# Patient Record
Sex: MALE | Race: WHITE | NOT HISPANIC OR LATINO | Employment: FULL TIME | URBAN - METROPOLITAN AREA
[De-identification: names, ages, dates, MRNs, and addresses within clinical notes are randomized per-mention and may not be internally consistent; named-entity substitution may affect disease eponyms.]

---

## 2022-03-09 ENCOUNTER — OFFICE VISIT (OUTPATIENT)
Dept: UROLOGY | Facility: CLINIC | Age: 62
End: 2022-03-09

## 2022-03-09 VITALS — HEIGHT: 74 IN | BODY MASS INDEX: 29 KG/M2 | WEIGHT: 226 LBS | RESPIRATION RATE: 18 BRPM | HEART RATE: 105 BPM

## 2022-03-09 DIAGNOSIS — R33.9 URINARY RETENTION: ICD-10-CM

## 2022-03-09 DIAGNOSIS — R31.29 MICROHEMATURIA: Primary | ICD-10-CM

## 2022-03-09 LAB
BILIRUB BLD-MCNC: NEGATIVE MG/DL
CLARITY, POC: CLEAR
COLOR UR: ABNORMAL
EXPIRATION DATE: ABNORMAL
GLUCOSE UR STRIP-MCNC: NEGATIVE MG/DL
KETONES UR QL: NEGATIVE
LEUKOCYTE EST, POC: ABNORMAL
Lab: ABNORMAL
NITRITE UR-MCNC: NEGATIVE MG/ML
PH UR: 5.5 [PH] (ref 5–8)
PROT UR STRIP-MCNC: NEGATIVE MG/DL
RBC # UR STRIP: ABNORMAL /UL
SP GR UR: 1.01 (ref 1–1.03)
SPECIMEN VOL SMN: 208 ML
UROBILINOGEN UR QL: NORMAL

## 2022-03-09 PROCEDURE — 87086 URINE CULTURE/COLONY COUNT: CPT | Performed by: NURSE PRACTITIONER

## 2022-03-09 PROCEDURE — 99203 OFFICE O/P NEW LOW 30 MIN: CPT | Performed by: NURSE PRACTITIONER

## 2022-03-09 PROCEDURE — 51798 US URINE CAPACITY MEASURE: CPT | Performed by: NURSE PRACTITIONER

## 2022-03-09 PROCEDURE — 81003 URINALYSIS AUTO W/O SCOPE: CPT | Performed by: NURSE PRACTITIONER

## 2022-03-09 RX ORDER — SULFAMETHOXAZOLE AND TRIMETHOPRIM 800; 160 MG/1; MG/1
TABLET ORAL
COMMUNITY
Start: 2022-02-16 | End: 2022-03-21

## 2022-03-09 RX ORDER — BENAZEPRIL HYDROCHLORIDE 20 MG/1
TABLET ORAL
COMMUNITY
Start: 2022-01-26

## 2022-03-09 RX ORDER — CIPROFLOXACIN 500 MG/1
500 TABLET, FILM COATED ORAL EVERY 12 HOURS
COMMUNITY
Start: 2022-02-22 | End: 2022-03-21

## 2022-03-09 RX ORDER — GLIMEPIRIDE 4 MG/1
TABLET ORAL
COMMUNITY
Start: 2022-01-26

## 2022-03-09 RX ORDER — SIMVASTATIN 20 MG
TABLET ORAL
COMMUNITY
Start: 2022-01-26

## 2022-03-09 RX ORDER — TAMSULOSIN HYDROCHLORIDE 0.4 MG/1
1 CAPSULE ORAL DAILY
Qty: 90 CAPSULE | Refills: 0 | Status: SHIPPED | OUTPATIENT
Start: 2022-03-09 | End: 2022-06-07

## 2022-03-09 RX ORDER — ALLOPURINOL 100 MG/1
TABLET ORAL
COMMUNITY
Start: 2022-01-26

## 2022-03-09 RX ORDER — FUROSEMIDE 20 MG/1
TABLET ORAL
COMMUNITY
Start: 2022-01-26

## 2022-03-10 LAB — BACTERIA SPEC AEROBE CULT: NO GROWTH

## 2022-03-11 ENCOUNTER — HOSPITAL ENCOUNTER (OUTPATIENT)
Dept: CT IMAGING | Facility: HOSPITAL | Age: 62
Discharge: HOME | End: 2022-03-11
Admitting: NURSE PRACTITIONER

## 2022-03-11 DIAGNOSIS — R31.29 MICROHEMATURIA: ICD-10-CM

## 2022-03-11 LAB
CREAT BLDA-MCNC: 1.7 MG/DL
EGFRCR SERPLBLD CKD-EPI 2021: 45.3 ML/MIN/1.73

## 2022-03-11 PROCEDURE — 74178 CT ABD&PLV WO CNTR FLWD CNTR: CPT

## 2022-03-11 PROCEDURE — 82565 ASSAY OF CREATININE: CPT

## 2022-03-11 PROCEDURE — 0 IOPAMIDOL PER 1 ML: Performed by: NURSE PRACTITIONER

## 2022-03-11 RX ADMIN — IOPAMIDOL 100 ML: 755 INJECTION, SOLUTION INTRAVENOUS at 10:26

## 2022-03-18 PROBLEM — R31.0 GROSS HEMATURIA: Status: ACTIVE | Noted: 2022-03-18

## 2022-03-18 PROBLEM — R35.0 BENIGN PROSTATIC HYPERPLASIA WITH URINARY FREQUENCY: Status: ACTIVE | Noted: 2022-03-18

## 2022-03-18 PROBLEM — N40.1 BENIGN PROSTATIC HYPERPLASIA WITH URINARY FREQUENCY: Status: ACTIVE | Noted: 2022-03-18

## 2022-03-21 ENCOUNTER — PREP FOR SURGERY (OUTPATIENT)
Dept: OTHER | Facility: HOSPITAL | Age: 62
End: 2022-03-21

## 2022-03-21 ENCOUNTER — OFFICE VISIT (OUTPATIENT)
Dept: UROLOGY | Facility: CLINIC | Age: 62
End: 2022-03-21

## 2022-03-21 VITALS — WEIGHT: 226.4 LBS | HEIGHT: 74 IN | BODY MASS INDEX: 29.05 KG/M2

## 2022-03-21 DIAGNOSIS — R31.0 GROSS HEMATURIA: Primary | ICD-10-CM

## 2022-03-21 DIAGNOSIS — N20.0 NEPHROLITHIASIS: Primary | ICD-10-CM

## 2022-03-21 DIAGNOSIS — R35.0 BENIGN PROSTATIC HYPERPLASIA WITH URINARY FREQUENCY: ICD-10-CM

## 2022-03-21 DIAGNOSIS — N40.1 BENIGN PROSTATIC HYPERPLASIA WITH URINARY FREQUENCY: ICD-10-CM

## 2022-03-21 LAB
BILIRUB BLD-MCNC: NEGATIVE MG/DL
CLARITY, POC: ABNORMAL
COLOR UR: YELLOW
EXPIRATION DATE: 223
GLUCOSE UR STRIP-MCNC: NEGATIVE MG/DL
KETONES UR QL: NEGATIVE
LEUKOCYTE EST, POC: ABNORMAL
Lab: ABNORMAL
NITRITE UR-MCNC: NEGATIVE MG/ML
PH UR: 6 [PH] (ref 5–8)
PROT UR STRIP-MCNC: ABNORMAL MG/DL
RBC # UR STRIP: ABNORMAL /UL
SP GR UR: 1.02 (ref 1–1.03)
SPECIMEN VOL 24H UR: 30 L
UROBILINOGEN UR QL: NORMAL

## 2022-03-21 PROCEDURE — 87086 URINE CULTURE/COLONY COUNT: CPT | Performed by: UROLOGY

## 2022-03-21 PROCEDURE — 51798 US URINE CAPACITY MEASURE: CPT | Performed by: UROLOGY

## 2022-03-21 PROCEDURE — 81003 URINALYSIS AUTO W/O SCOPE: CPT | Performed by: UROLOGY

## 2022-03-21 PROCEDURE — 99214 OFFICE O/P EST MOD 30 MIN: CPT | Performed by: UROLOGY

## 2022-03-21 RX ORDER — SODIUM CHLORIDE 0.9 % (FLUSH) 0.9 %
10 SYRINGE (ML) INJECTION AS NEEDED
Status: CANCELLED | OUTPATIENT
Start: 2022-03-21

## 2022-03-21 RX ORDER — SODIUM CHLORIDE 9 MG/ML
100 INJECTION, SOLUTION INTRAVENOUS CONTINUOUS
Status: CANCELLED | OUTPATIENT
Start: 2022-03-21

## 2022-03-21 RX ORDER — SODIUM CHLORIDE 0.9 % (FLUSH) 0.9 %
3 SYRINGE (ML) INJECTION EVERY 12 HOURS SCHEDULED
Status: CANCELLED | OUTPATIENT
Start: 2022-03-21

## 2022-03-21 RX ORDER — LEVOFLOXACIN 5 MG/ML
500 INJECTION, SOLUTION INTRAVENOUS ONCE
Status: CANCELLED | OUTPATIENT
Start: 2022-03-21 | End: 2022-03-21

## 2022-03-22 LAB — BACTERIA SPEC AEROBE CULT: NO GROWTH

## 2022-03-23 ENCOUNTER — TELEPHONE (OUTPATIENT)
Dept: UROLOGY | Facility: CLINIC | Age: 62
End: 2022-03-23

## 2022-05-19 ENCOUNTER — LAB (OUTPATIENT)
Dept: LAB | Facility: HOSPITAL | Age: 62
End: 2022-05-19

## 2022-05-19 ENCOUNTER — TELEPHONE (OUTPATIENT)
Dept: UROLOGY | Facility: CLINIC | Age: 62
End: 2022-05-19

## 2022-05-19 DIAGNOSIS — R30.0 DYSURIA: Primary | ICD-10-CM

## 2022-05-19 DIAGNOSIS — R30.0 DYSURIA: ICD-10-CM

## 2022-05-19 PROCEDURE — 87086 URINE CULTURE/COLONY COUNT: CPT

## 2022-05-20 LAB — BACTERIA SPEC AEROBE CULT: NO GROWTH

## 2022-05-27 ENCOUNTER — OFFICE VISIT (OUTPATIENT)
Dept: UROLOGY | Facility: CLINIC | Age: 62
End: 2022-05-27

## 2022-05-27 VITALS — RESPIRATION RATE: 16 BRPM | BODY MASS INDEX: 28.36 KG/M2 | WEIGHT: 221 LBS | HEIGHT: 74 IN

## 2022-05-27 DIAGNOSIS — N35.811 OTHER STRICTURE OF URETHRAL MEATUS IN MALE: ICD-10-CM

## 2022-05-27 DIAGNOSIS — R30.0 DYSURIA: Primary | ICD-10-CM

## 2022-05-27 DIAGNOSIS — N34.2 URETHRITIS: ICD-10-CM

## 2022-05-27 LAB
BILIRUB BLD-MCNC: NEGATIVE MG/DL
CLARITY, POC: CLEAR
COLOR UR: YELLOW
EXPIRATION DATE: ABNORMAL
GLUCOSE UR STRIP-MCNC: NEGATIVE MG/DL
KETONES UR QL: NEGATIVE
LEUKOCYTE EST, POC: ABNORMAL
Lab: ABNORMAL
NITRITE UR-MCNC: NEGATIVE MG/ML
PH UR: 6 [PH] (ref 5–8)
PROT UR STRIP-MCNC: ABNORMAL MG/DL
RBC # UR STRIP: ABNORMAL /UL
SP GR UR: 1.01 (ref 1–1.03)
UROBILINOGEN UR QL: NORMAL

## 2022-05-27 PROCEDURE — 87086 URINE CULTURE/COLONY COUNT: CPT | Performed by: NURSE PRACTITIONER

## 2022-05-27 PROCEDURE — 87077 CULTURE AEROBIC IDENTIFY: CPT | Performed by: NURSE PRACTITIONER

## 2022-05-27 PROCEDURE — 99213 OFFICE O/P EST LOW 20 MIN: CPT | Performed by: NURSE PRACTITIONER

## 2022-05-27 PROCEDURE — 81003 URINALYSIS AUTO W/O SCOPE: CPT | Performed by: NURSE PRACTITIONER

## 2022-05-27 RX ORDER — DOXYCYCLINE HYCLATE 100 MG/1
100 CAPSULE ORAL 2 TIMES DAILY
Qty: 56 CAPSULE | Refills: 0 | Status: SHIPPED | OUTPATIENT
Start: 2022-05-27 | End: 2022-06-24

## 2022-05-29 LAB — BACTERIA SPEC AEROBE CULT: ABNORMAL

## 2022-06-29 ENCOUNTER — TELEPHONE (OUTPATIENT)
Dept: UROLOGY | Facility: CLINIC | Age: 62
End: 2022-06-29

## 2022-06-30 ENCOUNTER — DOCUMENTATION (OUTPATIENT)
Dept: UROLOGY | Facility: CLINIC | Age: 62
End: 2022-06-30

## 2022-06-30 DIAGNOSIS — R30.0 DYSURIA: Primary | ICD-10-CM

## 2022-06-30 RX ORDER — TAMSULOSIN HYDROCHLORIDE 0.4 MG/1
1 CAPSULE ORAL DAILY
Qty: 30 CAPSULE | Refills: 11 | Status: SHIPPED | OUTPATIENT
Start: 2022-06-30 | End: 2023-06-30

## 2022-07-12 ENCOUNTER — TELEPHONE (OUTPATIENT)
Dept: SURGERY | Facility: CLINIC | Age: 62
End: 2022-07-12

## 2024-07-23 ENCOUNTER — OFFICE VISIT (OUTPATIENT)
Dept: ORTHOPEDIC SURGERY | Facility: CLINIC | Age: 64
End: 2024-07-23
Payer: COMMERCIAL

## 2024-07-23 VITALS
OXYGEN SATURATION: 97 % | HEART RATE: 89 BPM | DIASTOLIC BLOOD PRESSURE: 84 MMHG | BODY MASS INDEX: 30.03 KG/M2 | HEIGHT: 74 IN | WEIGHT: 234 LBS | SYSTOLIC BLOOD PRESSURE: 127 MMHG

## 2024-07-23 DIAGNOSIS — M25.561 RIGHT KNEE PAIN, UNSPECIFIED CHRONICITY: Primary | ICD-10-CM

## 2024-07-23 DIAGNOSIS — M17.11 OSTEOARTHRITIS OF RIGHT KNEE, UNSPECIFIED OSTEOARTHRITIS TYPE: ICD-10-CM

## 2024-07-23 PROCEDURE — 99203 OFFICE O/P NEW LOW 30 MIN: CPT | Performed by: ORTHOPAEDIC SURGERY

## 2024-07-23 RX ORDER — TAMSULOSIN HYDROCHLORIDE 0.4 MG/1
1 CAPSULE ORAL DAILY
COMMUNITY

## 2024-07-23 RX ORDER — DICLOFENAC SODIUM 75 MG/1
75 TABLET, DELAYED RELEASE ORAL 2 TIMES DAILY
Qty: 60 TABLET | Refills: 1 | Status: SHIPPED | OUTPATIENT
Start: 2024-07-23

## 2024-07-24 ENCOUNTER — PATIENT ROUNDING (BHMG ONLY) (OUTPATIENT)
Dept: ORTHOPEDIC SURGERY | Facility: CLINIC | Age: 64
End: 2024-07-24
Payer: COMMERCIAL

## 2024-09-05 ENCOUNTER — OFFICE VISIT (OUTPATIENT)
Dept: ORTHOPEDIC SURGERY | Facility: CLINIC | Age: 64
End: 2024-09-05
Payer: COMMERCIAL

## 2024-09-05 VITALS
SYSTOLIC BLOOD PRESSURE: 136 MMHG | HEART RATE: 97 BPM | DIASTOLIC BLOOD PRESSURE: 82 MMHG | WEIGHT: 219.7 LBS | HEIGHT: 74 IN | OXYGEN SATURATION: 98 % | BODY MASS INDEX: 28.19 KG/M2

## 2024-09-05 DIAGNOSIS — M25.561 RIGHT KNEE PAIN, UNSPECIFIED CHRONICITY: ICD-10-CM

## 2024-09-05 DIAGNOSIS — M17.11 OSTEOARTHRITIS OF RIGHT KNEE, UNSPECIFIED OSTEOARTHRITIS TYPE: Primary | ICD-10-CM

## 2024-09-05 RX ORDER — LIDOCAINE HYDROCHLORIDE 10 MG/ML
5 INJECTION, SOLUTION INFILTRATION; PERINEURAL
Status: COMPLETED | OUTPATIENT
Start: 2024-09-05 | End: 2024-09-05

## 2024-09-05 RX ORDER — TRIAMCINOLONE ACETONIDE 40 MG/ML
40 INJECTION, SUSPENSION INTRA-ARTICULAR; INTRAMUSCULAR
Status: COMPLETED | OUTPATIENT
Start: 2024-09-05 | End: 2024-09-05

## 2024-09-05 RX ADMIN — TRIAMCINOLONE ACETONIDE 40 MG: 40 INJECTION, SUSPENSION INTRA-ARTICULAR; INTRAMUSCULAR at 09:18

## 2024-09-05 RX ADMIN — LIDOCAINE HYDROCHLORIDE 5 ML: 10 INJECTION, SOLUTION INFILTRATION; PERINEURAL at 09:18

## 2025-04-24 ENCOUNTER — OFFICE VISIT (OUTPATIENT)
Dept: ORTHOPEDIC SURGERY | Facility: CLINIC | Age: 65
End: 2025-04-24
Payer: COMMERCIAL

## 2025-04-24 VITALS
DIASTOLIC BLOOD PRESSURE: 90 MMHG | SYSTOLIC BLOOD PRESSURE: 144 MMHG | WEIGHT: 219 LBS | HEIGHT: 74 IN | OXYGEN SATURATION: 98 % | HEART RATE: 86 BPM | BODY MASS INDEX: 28.11 KG/M2

## 2025-04-24 DIAGNOSIS — M25.561 RIGHT KNEE PAIN, UNSPECIFIED CHRONICITY: ICD-10-CM

## 2025-04-24 DIAGNOSIS — M17.11 OSTEOARTHRITIS OF RIGHT KNEE, UNSPECIFIED OSTEOARTHRITIS TYPE: Primary | ICD-10-CM

## 2025-04-24 RX ORDER — LIDOCAINE HYDROCHLORIDE 10 MG/ML
5 INJECTION, SOLUTION EPIDURAL; INFILTRATION; INTRACAUDAL; PERINEURAL
Status: COMPLETED | OUTPATIENT
Start: 2025-04-24 | End: 2025-04-24

## 2025-04-24 RX ORDER — TRIAMCINOLONE ACETONIDE 40 MG/ML
40 INJECTION, SUSPENSION INTRA-ARTICULAR; INTRAMUSCULAR
Status: COMPLETED | OUTPATIENT
Start: 2025-04-24 | End: 2025-04-24

## 2025-04-24 RX ADMIN — TRIAMCINOLONE ACETONIDE 40 MG: 40 INJECTION, SUSPENSION INTRA-ARTICULAR; INTRAMUSCULAR at 09:40

## 2025-04-24 RX ADMIN — LIDOCAINE HYDROCHLORIDE 5 ML: 10 INJECTION, SOLUTION EPIDURAL; INFILTRATION; INTRACAUDAL; PERINEURAL at 09:40

## 2025-04-24 NOTE — PROGRESS NOTES
"Chief Complaint  Follow-up of the Right Knee     Subjective      Armin Weaver presents to [facility] for follow up of the right knee. He has had arthritis he has treated conservatively with injections.  His last injection was 9/5/24.  He notes the pain has increased the last few weeks.  He has pain with prolonged ambulation, stairs, stooping and squatting. He had no recent injury or fall.     Allergies   Allergen Reactions    Penicillins Unknown - High Severity        Social History     Socioeconomic History    Marital status:    Tobacco Use    Smoking status: Never    Smokeless tobacco: Former   Vaping Use    Vaping status: Never Used   Substance and Sexual Activity    Alcohol use: Yes     Alcohol/week: 7.0 standard drinks of alcohol     Types: 7 Shots of liquor per week    Drug use: Never    Sexual activity: Defer        I reviewed the patient's chief complaint, history of present illness, review of systems, past medical history, surgical history, family history, social history, medications, and allergy list.     Review of Systems     Constitutional: Denies fevers, chills, weight loss  Cardiovascular: Denies chest pain, shortness of breath  Skin: Denies rashes, acute skin changes  Neurologic: Denies headache, loss of consciousness        Vital Signs:   /90   Pulse 86   Ht 188 cm (74\")   Wt 99.3 kg (219 lb)   SpO2 98%   BMI 28.12 kg/m²          Physical Exam  General: Alert. No acute distress    Ortho Exam           RIGHT KNEE Flexion 120. Extension 0. Stable to varus/valgus stress. Stable to anterior/posterior drawer. Neurovascularly intact.  Positive EHL, FHL, HS and TA. Sensation intact to light touch all 5 nerves of the foot. Ambulates with Antalgic gait. Patella is well tracking. Calf supple, non-tender.       Large Joint Arthrocentesis: R knee  Date/Time: 4/24/2025 9:40 AM  Consent given by: patient  Site marked: site marked  Timeout: Immediately prior to " procedure a time out was called to verify the correct patient, procedure, equipment, support staff and site/side marked as required   Supporting Documentation  Indications: pain   Procedure Details  Location: knee - R knee  Preparation: Patient was prepped and draped in the usual sterile fashion  Needle gauge: 21 G.  Medications administered: 40 mg triamcinolone acetonide 40 MG/ML; 5 mL lidocaine PF 1% 1 %  Patient tolerance: patient tolerated the procedure well with no immediate complications    This injection documentation was Scribed for Javon Cline MD by Edna Bryan.  04/24/25   09:40 EDT      Imaging Results (Most Recent)       None             Result Review :          Assessment and Plan     Diagnoses and all orders for this visit:    1. Osteoarthritis of right knee, unspecified osteoarthritis type (Primary)    2. Right knee pain, unspecified chronicity        Discussed the treatment plan with the patient.     Discussed the risks and benefits of conservative measures. The patient expressed understanding and wished to proceed with a right knee steroid injection.  He tolerated the injection well.      Discussed with the patient that due to the steroid injection given today in the office they may see an increase in blood sugar for a few days. Advised patient to monitor sugar after receiving the injection.     Discussed possibility of a reaction from the injection.  Discussed the possibility that the injection may not completely improve or remove the pain.  Discussed the risk of infection.      Call or return if worsening symptoms.    Follow Up     PRN      Patient was given instructions and counseling regarding his condition or for health maintenance advice. Please see specific information pulled into the AVS if appropriate.     Scribed for Javon Cline MD by Natty Alonoz MA.  04/24/25   09:26 EDT    I have personally performed the services described in this document as scribed by the above  individual and it is both accurate and complete. Javon Cline MD 04/24/25

## 2025-06-18 ENCOUNTER — TELEPHONE (OUTPATIENT)
Dept: ORTHOPEDIC SURGERY | Facility: CLINIC | Age: 65
End: 2025-06-18

## 2025-06-18 NOTE — TELEPHONE ENCOUNTER
"Caller: Armin Weaver \"Brendon\"    Relationship to patient: Self    Best call back number: [phone]    Chief complaint: RIGHT KNEE    Type of visit: GEL INJECTION    Requested date: ASAP     Additional notes:PATIENT NOW HAS MEDICARE            "

## 2025-06-18 NOTE — TELEPHONE ENCOUNTER
APPT: 6-27 AT 8:30 AM RT KNEE SYNVISC ONE INJ WITH HOLDEN    LAST RT KNEE STEROID INJ:  4-24    MEDICARE = PRIMARY INS

## 2025-06-27 ENCOUNTER — OFFICE VISIT (OUTPATIENT)
Dept: ORTHOPEDIC SURGERY | Facility: CLINIC | Age: 65
End: 2025-06-27
Payer: MEDICARE

## 2025-06-27 VITALS
HEART RATE: 83 BPM | BODY MASS INDEX: 28.11 KG/M2 | WEIGHT: 219 LBS | DIASTOLIC BLOOD PRESSURE: 79 MMHG | SYSTOLIC BLOOD PRESSURE: 129 MMHG | HEIGHT: 74 IN | OXYGEN SATURATION: 97 %

## 2025-06-27 DIAGNOSIS — M17.11 OSTEOARTHRITIS OF RIGHT KNEE, UNSPECIFIED OSTEOARTHRITIS TYPE: Primary | ICD-10-CM

## 2025-06-27 RX ORDER — IBUPROFEN 800 MG/1
800 TABLET, FILM COATED ORAL 2 TIMES DAILY PRN
Qty: 90 TABLET | Refills: 0 | Status: SHIPPED | OUTPATIENT
Start: 2025-06-27

## 2025-06-27 NOTE — PROGRESS NOTES
"Chief Complaint  Follow-up of the Right Knee    Subjective      Armin Weaver presents to [facility]     History of Present Illness  The patient is here today for a follow-up on his right knee. He has right knee pain and osteoarthritis and has been trying to manage it conservatively with intermittent injections. He was last seen in the office on 04/24/2025 and received a right knee steroid injection. Prior to that, on 09/05/2024, he also received a right knee steroid injection. Today, he is here for authorization approval to receive right knee Synvisc.     He reports an improvement in his knee condition following the first round of steroid injections. The effects of the first injection administered in 08/2024 lasted until 04/2025, when he received the second injection. He has been informed that surgical intervention may be necessary in the future. He has not had his other knee x-rayed. He has a knot on the back of his knee that causes pain and serves as an indicator of impending knee pain. He has considered having it drained but was advised to opt for steroid injections instead. He has a knee sleeve at home but does not use it regularly.      Allergies   Allergen Reactions    Penicillins Unknown - High Severity       Objective     Vital Signs:   Vitals:    06/27/25 0835   BP: 129/79   Pulse: 83   SpO2: 97%   Weight: 99.3 kg (219 lb)   Height: 188 cm (74\")     Body mass index is 28.12 kg/m².    I reviewed the patient's chief complaint, history of present illness, review of systems, past medical history, surgical history, family history, social history, medications, and allergy list.     Ortho Exam      Physical Exam  Musculoskeletal:  Right knee: 0 degrees of extension, 140 degrees of flexion, tenderness to the medial and lateral joint lines, crepitus with motion      Large Joint Arthrocentesis: R knee  Date/Time: 6/27/2025 8:52 AM  Consent given by: patient  Site marked: site " marked  Timeout: Immediately prior to procedure a time out was called to verify the correct patient, procedure, equipment, support staff and site/side marked as required   Supporting Documentation  Indications: pain   Procedure Details  Location: knee - R knee  Preparation: Patient was prepped and draped in the usual sterile fashion  Needle gauge: 21 G.  Medications administered: 48 mg hylan 48 MG/6ML  Patient tolerance: patient tolerated the procedure well with no immediate complications       This injection documentation was Scribed for JEREMY López by Laurel Pepper, SUSANNAH.  06/27/25   08:52 EDT      Imaging Results (Most Recent)       None             Results  Imaging   - X-ray of the right knee: Bone on bone contact and significant arthritis         Assessment and Plan   Diagnoses and all orders for this visit:    1. Osteoarthritis of right knee, unspecified osteoarthritis type (Primary)  -     ibuprofen (ADVIL,MOTRIN) 800 MG tablet; Take 1 tablet by mouth 2 (Two) Times a Day As Needed for Moderate Pain.  Dispense: 90 tablet; Refill: 0    Other orders  -     Large Joint Arthrocentesis: R knee       Assessment & Plan  Right knee pain:  The patient has been experiencing right knee pain due to osteoarthritis, managed conservatively with intermittent steroid injections. Previous injections provided relief for up to 2 months. He has authorization approval for right knee Synvisc today.     Patient was informed of possible adverse effects including but not limited to bleeding, damage to nerve, tendon or artery, increased blood sugar and increased blood pressure. Discussed possibility of a reaction from the injection.  Discussed the possibility that the injection may not completely improve or remove the pain.  Discussed the risk of infection.  Discussed the possibility of worsening pain after the injection.  Prior to the joint injection, an active timeout was performed in accordance with safety protocols.  This  included verification of the patient's identity, correct site and procedure.  The medication, dosage and route of administration were confirmed.  Injection site was marked and the patient was placed in a seated position with the knee flexed at 90 degrees.  Patient consent was confirmed.  Right knee Synvisc  was injected into the lateral knee joint compartment following sterile technique.  Band-Aid placed on injection site.  Patient tolerated well.      A hyaluronic acid and joaquin comb injection was administered today to provide cushioning and reduce pain. The injection might cause increased discomfort initially, for which ice, heat, ibuprofen, or Tylenol can be used. Staying active is encouraged to help the medication distribute within the joint. The use of a knee sleeve during activities like mowing was suggested to manage swelling and discomfort. If the injection proves effective, it can be repeated every 6 months, with the option of adjunctive steroid injections if needed.        Follow up as needed.   Eligible for right knee steroid in 6 weeks if needed.          Tobacco Use: Medium Risk (6/27/2025)    Patient History     Smoking Tobacco Use: Never     Smokeless Tobacco Use: Former     Passive Exposure: Not on file     Patient reports that they are a nonsmoker; cessation education not applicable.            Follow Up   No follow-ups on file.  There are no Patient Instructions on file for this visit.    Patient was given instructions and counseling regarding his condition or for health maintenance advice. Please see specific information pulled into the AVS if appropriate.     Patient or patient representative verbalized consent for the use of Ambient Listening during the visit with  JEREMY López for chart documentation. 6/29/2025  10:16 EDT    Dictated Utilizing Dragon Dictation. Please note that portions of this note were completed with a voice recognition program. Part of this note may be an electronic  transcription/translation of spoken language to printed text using the Dragon Dictation System.